# Patient Record
Sex: MALE | Race: OTHER | HISPANIC OR LATINO | URBAN - METROPOLITAN AREA
[De-identification: names, ages, dates, MRNs, and addresses within clinical notes are randomized per-mention and may not be internally consistent; named-entity substitution may affect disease eponyms.]

---

## 2018-01-26 ENCOUNTER — EMERGENCY (EMERGENCY)
Facility: HOSPITAL | Age: 33
LOS: 1 days | Discharge: HOME | End: 2018-01-26
Attending: EMERGENCY MEDICINE | Admitting: EMERGENCY MEDICINE
Payer: MEDICAID

## 2018-01-26 VITALS
OXYGEN SATURATION: 98 % | SYSTOLIC BLOOD PRESSURE: 103 MMHG | DIASTOLIC BLOOD PRESSURE: 60 MMHG | RESPIRATION RATE: 16 BRPM | HEART RATE: 77 BPM | TEMPERATURE: 100 F

## 2018-01-26 VITALS
HEART RATE: 104 BPM | DIASTOLIC BLOOD PRESSURE: 61 MMHG | RESPIRATION RATE: 18 BRPM | TEMPERATURE: 102 F | OXYGEN SATURATION: 97 % | SYSTOLIC BLOOD PRESSURE: 103 MMHG

## 2018-01-26 PROCEDURE — 99284 EMERGENCY DEPT VISIT MOD MDM: CPT | Mod: 25

## 2018-01-26 PROCEDURE — 71046 X-RAY EXAM CHEST 2 VIEWS: CPT | Mod: 26

## 2018-01-26 RX ORDER — SODIUM CHLORIDE 9 MG/ML
1000 INJECTION INTRAMUSCULAR; INTRAVENOUS; SUBCUTANEOUS ONCE
Qty: 0 | Refills: 0 | Status: COMPLETED | OUTPATIENT
Start: 2018-01-26 | End: 2018-01-26

## 2018-01-26 RX ORDER — ACETAMINOPHEN 500 MG
975 TABLET ORAL ONCE
Qty: 0 | Refills: 0 | Status: COMPLETED | OUTPATIENT
Start: 2018-01-26 | End: 2018-01-26

## 2018-01-26 RX ADMIN — Medication 975 MILLIGRAM(S): at 02:47

## 2018-01-26 RX ADMIN — SODIUM CHLORIDE 1000 MILLILITER(S): 9 INJECTION INTRAMUSCULAR; INTRAVENOUS; SUBCUTANEOUS at 02:41

## 2018-01-26 NOTE — ED ADULT NURSE NOTE - OBJECTIVE STATEMENT
pt rc'd to room 17 A&Ox3 c/o flu-like symptoms. Pt states he woke up from sleep with chills and felt SOB. Pt c/o general malaise fevers/chills/ nasal congestion x1 week. Pt denies any medical history/medication use. Respirations even/ unlabored, O2 sat 95% on RA, lungs clear to ausculation. Pt denies CP/SOB/urinary changes. IV 20G placed L ac vein, MD evaluated, NSR on monitor

## 2018-01-26 NOTE — ED PROVIDER NOTE - OBJECTIVE STATEMENT
31 y/o M with no significant PMH here with 1 week of mylagias, fever, dry cough, nasal congestion.  Pt has been taking theraflu at home for symptoms.  Tonight while lying in bed started to feel difficulty breathing due to congestion.  He went to stand up, but upon standing felt lightheaded as if he were going to fall over.  Lightheadedness improves with sitting down.  He endorses poor appetite, but is still drinking fluids.  Last medication was nyquil around 7pm.  No known sick contacts, recent travel or hospitalizations.  No chest pain back pain, palpitations, abd pain, n/v/d, urinary sxs.

## 2018-01-26 NOTE — ED PROVIDER NOTE - MEDICAL DECISION MAKING DETAILS
31 y/o M with 1 week of flu like sxs.  Likely viral and pt w evidence of dehydration, will hydrate, antipyretics, check cxr given cough x 1 week r/o undelrying pna.  Plan for home with outpatient follow-up.

## 2018-01-26 NOTE — ED ADULT TRIAGE NOTE - CHIEF COMPLAINT QUOTE
Patient from home c/o generalized malaise. Patient c/o malaise, sweating in bed at night. Patient took vicks and nyquil with no relief. Last nyquil around 7:30pm last night. Denies any other medical hx. Febrile in triage.

## 2018-04-04 PROBLEM — Z00.00 ENCOUNTER FOR PREVENTIVE HEALTH EXAMINATION: Status: ACTIVE | Noted: 2018-04-04

## 2018-04-10 ENCOUNTER — APPOINTMENT (OUTPATIENT)
Dept: UROLOGY | Facility: CLINIC | Age: 33
End: 2018-04-10
Payer: MEDICAID

## 2018-04-10 VITALS
HEART RATE: 71 BPM | WEIGHT: 186 LBS | DIASTOLIC BLOOD PRESSURE: 83 MMHG | SYSTOLIC BLOOD PRESSURE: 148 MMHG | HEIGHT: 66 IN | RESPIRATION RATE: 16 BRPM | BODY MASS INDEX: 29.89 KG/M2

## 2018-04-10 DIAGNOSIS — G89.29 LOW BACK PAIN: ICD-10-CM

## 2018-04-10 DIAGNOSIS — Z80.42 FAMILY HISTORY OF MALIGNANT NEOPLASM OF PROSTATE: ICD-10-CM

## 2018-04-10 DIAGNOSIS — M54.5 LOW BACK PAIN: ICD-10-CM

## 2018-04-10 DIAGNOSIS — R10.2 PELVIC AND PERINEAL PAIN: ICD-10-CM

## 2018-04-10 PROCEDURE — 99204 OFFICE O/P NEW MOD 45 MIN: CPT

## 2018-04-11 LAB
BILIRUB UR QL STRIP: NORMAL
GLUCOSE UR-MCNC: NORMAL
HCG UR QL: 0.2 EU/DL
HGB UR QL STRIP.AUTO: NORMAL
KETONES UR-MCNC: NORMAL
LEUKOCYTE ESTERASE UR QL STRIP: NORMAL
NITRITE UR QL STRIP: NORMAL
PH UR STRIP: 7
PROT UR STRIP-MCNC: NORMAL
SP GR UR STRIP: 1.01

## 2018-04-16 ENCOUNTER — FORM ENCOUNTER (OUTPATIENT)
Age: 33
End: 2018-04-16

## 2018-04-17 ENCOUNTER — APPOINTMENT (OUTPATIENT)
Dept: ULTRASOUND IMAGING | Facility: IMAGING CENTER | Age: 33
End: 2018-04-17
Payer: MEDICAID

## 2018-04-17 ENCOUNTER — OUTPATIENT (OUTPATIENT)
Dept: OUTPATIENT SERVICES | Facility: HOSPITAL | Age: 33
LOS: 1 days | End: 2018-04-17
Payer: MEDICAID

## 2018-04-17 DIAGNOSIS — Z00.8 ENCOUNTER FOR OTHER GENERAL EXAMINATION: ICD-10-CM

## 2018-04-17 PROCEDURE — 76770 US EXAM ABDO BACK WALL COMP: CPT | Mod: 26

## 2018-04-17 PROCEDURE — 76770 US EXAM ABDO BACK WALL COMP: CPT

## 2018-05-01 ENCOUNTER — TRANSCRIPTION ENCOUNTER (OUTPATIENT)
Age: 33
End: 2018-05-01

## 2018-06-01 ENCOUNTER — OUTPATIENT (OUTPATIENT)
Dept: OUTPATIENT SERVICES | Facility: HOSPITAL | Age: 33
LOS: 1 days | End: 2018-06-01

## 2018-06-26 ENCOUNTER — EMERGENCY (EMERGENCY)
Facility: HOSPITAL | Age: 33
LOS: 1 days | Discharge: HOME | End: 2018-06-26
Attending: EMERGENCY MEDICINE
Payer: MEDICAID

## 2018-06-26 ENCOUNTER — APPOINTMENT (OUTPATIENT)
Dept: SPORTS MEDICINE | Facility: CLINIC | Age: 33
End: 2018-06-26
Payer: MEDICAID

## 2018-06-26 VITALS
HEART RATE: 93 BPM | WEIGHT: 186.07 LBS | OXYGEN SATURATION: 98 % | SYSTOLIC BLOOD PRESSURE: 118 MMHG | DIASTOLIC BLOOD PRESSURE: 70 MMHG | TEMPERATURE: 98 F | RESPIRATION RATE: 16 BRPM

## 2018-06-26 DIAGNOSIS — S92.354D NONDISPLACED FRACTURE OF FIFTH METATARSAL BONE, RIGHT FOOT, SUBSEQUENT ENCOUNTER FOR FRACTURE WITH ROUTINE HEALING: ICD-10-CM

## 2018-06-26 PROCEDURE — 29515 APPLICATION SHORT LEG SPLINT: CPT | Mod: RT

## 2018-06-26 PROCEDURE — 73630 X-RAY EXAM OF FOOT: CPT

## 2018-06-26 PROCEDURE — 29515 APPLICATION SHORT LEG SPLINT: CPT

## 2018-06-26 PROCEDURE — 99204 OFFICE O/P NEW MOD 45 MIN: CPT

## 2018-06-26 PROCEDURE — 73630 X-RAY EXAM OF FOOT: CPT | Mod: 26,RT

## 2018-06-26 PROCEDURE — 99283 EMERGENCY DEPT VISIT LOW MDM: CPT | Mod: 25

## 2018-06-26 NOTE — ED ADULT NURSE NOTE - OBJECTIVE STATEMENT
31 y/o M, no PMH, PSH, daily meds, presents ambulatory to ED c/o R lateral foot pain, ecchymosis, swelling s/p inversion injury while playing basketball 4 days ago, Saturday. 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 Pt has pos and equal sensation to extremities bilat, pos and equal strength to extremities x 4, strong peripheral pulses x 4, no numbness/tingling.

## 2018-06-26 NOTE — ED ADULT NURSE NOTE - DISCHARGE TEACHING
Kenzie TRAVIS, pt verbalizes understanding to f/u with PCP and return to ED for any worsening symptoms.

## 2018-06-26 NOTE — ED PROVIDER NOTE - PROGRESS NOTE DETAILS
Saad Perea PGY1: seen w/ Dr. [place], declining pain meds, pain bearable, still pending xr Saad Perea PGY1: XR w/ fx base 5th metatarsal c/w julito fx; will apply splint, dc w/ close outpt ortho and podiatry f/u

## 2018-06-26 NOTE — ED ADULT TRIAGE NOTE - CHIEF COMPLAINT QUOTE
pt c/o "rt foot pain s/p injury playing basketball on Sat. Noticed bruising to side of foot this am"

## 2018-06-26 NOTE — ED PROVIDER NOTE - OBJECTIVE STATEMENT
32y male  no pmh,no habits comes to ed complains of pain rt foot after inversion same playing basketball. Has pain on walking but none at rest. Has to heel walk, No other injury or complaint. Localized to [facility] mt

## 2018-07-01 ENCOUNTER — OUTPATIENT (OUTPATIENT)
Dept: OUTPATIENT SERVICES | Facility: HOSPITAL | Age: 33
LOS: 1 days | End: 2018-07-01

## 2018-07-02 DIAGNOSIS — Z71.89 OTHER SPECIFIED COUNSELING: ICD-10-CM

## 2018-07-25 DIAGNOSIS — Z71.89 OTHER SPECIFIED COUNSELING: ICD-10-CM

## 2019-02-02 ENCOUNTER — EMERGENCY (EMERGENCY)
Facility: HOSPITAL | Age: 34
LOS: 1 days | Discharge: HOME | End: 2019-02-02
Attending: EMERGENCY MEDICINE
Payer: MEDICAID

## 2019-02-02 VITALS
SYSTOLIC BLOOD PRESSURE: 119 MMHG | HEART RATE: 74 BPM | WEIGHT: 186.07 LBS | TEMPERATURE: 98 F | OXYGEN SATURATION: 97 % | HEIGHT: 72 IN | DIASTOLIC BLOOD PRESSURE: 72 MMHG | RESPIRATION RATE: 20 BRPM

## 2019-02-02 LAB
ALBUMIN SERPL ELPH-MCNC: 5.1 G/DL — HIGH (ref 3.3–5)
ALP SERPL-CCNC: 89 U/L — SIGNIFICANT CHANGE UP (ref 40–120)
ALT FLD-CCNC: 30 U/L — SIGNIFICANT CHANGE UP (ref 10–45)
ANION GAP SERPL CALC-SCNC: 13 MMOL/L — SIGNIFICANT CHANGE UP (ref 5–17)
AST SERPL-CCNC: 21 U/L — SIGNIFICANT CHANGE UP (ref 10–40)
BASOPHILS # BLD AUTO: 0 K/UL — SIGNIFICANT CHANGE UP (ref 0–0.2)
BASOPHILS NFR BLD AUTO: 0.3 % — SIGNIFICANT CHANGE UP (ref 0–2)
BILIRUB SERPL-MCNC: 0.7 MG/DL — SIGNIFICANT CHANGE UP (ref 0.2–1.2)
BUN SERPL-MCNC: 13 MG/DL — SIGNIFICANT CHANGE UP (ref 7–23)
CALCIUM SERPL-MCNC: 10 MG/DL — SIGNIFICANT CHANGE UP (ref 8.4–10.5)
CHLORIDE SERPL-SCNC: 100 MMOL/L — SIGNIFICANT CHANGE UP (ref 96–108)
CO2 SERPL-SCNC: 25 MMOL/L — SIGNIFICANT CHANGE UP (ref 22–31)
CREAT SERPL-MCNC: 1.13 MG/DL — SIGNIFICANT CHANGE UP (ref 0.5–1.3)
D DIMER BLD IA.RAPID-MCNC: <150 NG/ML DDU — SIGNIFICANT CHANGE UP
EOSINOPHIL # BLD AUTO: 0.1 K/UL — SIGNIFICANT CHANGE UP (ref 0–0.5)
EOSINOPHIL NFR BLD AUTO: 0.6 % — SIGNIFICANT CHANGE UP (ref 0–6)
GLUCOSE SERPL-MCNC: 94 MG/DL — SIGNIFICANT CHANGE UP (ref 70–99)
HCT VFR BLD CALC: 50.2 % — HIGH (ref 39–50)
HGB BLD-MCNC: 17.7 G/DL — HIGH (ref 13–17)
LYMPHOCYTES # BLD AUTO: 2.8 K/UL — SIGNIFICANT CHANGE UP (ref 1–3.3)
LYMPHOCYTES # BLD AUTO: 32.7 % — SIGNIFICANT CHANGE UP (ref 13–44)
MCHC RBC-ENTMCNC: 28.9 PG — SIGNIFICANT CHANGE UP (ref 27–34)
MCHC RBC-ENTMCNC: 35.2 GM/DL — SIGNIFICANT CHANGE UP (ref 32–36)
MCV RBC AUTO: 81.9 FL — SIGNIFICANT CHANGE UP (ref 80–100)
MONOCYTES # BLD AUTO: 0.5 K/UL — SIGNIFICANT CHANGE UP (ref 0–0.9)
MONOCYTES NFR BLD AUTO: 5.5 % — SIGNIFICANT CHANGE UP (ref 2–14)
NEUTROPHILS # BLD AUTO: 5.3 K/UL — SIGNIFICANT CHANGE UP (ref 1.8–7.4)
NEUTROPHILS NFR BLD AUTO: 60.9 % — SIGNIFICANT CHANGE UP (ref 43–77)
PLATELET # BLD AUTO: 308 K/UL — SIGNIFICANT CHANGE UP (ref 150–400)
POTASSIUM SERPL-MCNC: 4.2 MMOL/L — SIGNIFICANT CHANGE UP (ref 3.5–5.3)
POTASSIUM SERPL-SCNC: 4.2 MMOL/L — SIGNIFICANT CHANGE UP (ref 3.5–5.3)
PROT SERPL-MCNC: 8.6 G/DL — HIGH (ref 6–8.3)
RBC # BLD: 6.12 M/UL — HIGH (ref 4.2–5.8)
RBC # FLD: 12 % — SIGNIFICANT CHANGE UP (ref 10.3–14.5)
SODIUM SERPL-SCNC: 138 MMOL/L — SIGNIFICANT CHANGE UP (ref 135–145)
TROPONIN T, HIGH SENSITIVITY RESULT: <6 NG/L — SIGNIFICANT CHANGE UP (ref 0–51)
WBC # BLD: 8.7 K/UL — SIGNIFICANT CHANGE UP (ref 3.8–10.5)
WBC # FLD AUTO: 8.7 K/UL — SIGNIFICANT CHANGE UP (ref 3.8–10.5)

## 2019-02-02 PROCEDURE — 71046 X-RAY EXAM CHEST 2 VIEWS: CPT | Mod: 26

## 2019-02-02 PROCEDURE — 99220: CPT

## 2019-02-02 RX ORDER — FAMOTIDINE 10 MG/ML
20 INJECTION INTRAVENOUS ONCE
Qty: 0 | Refills: 0 | Status: COMPLETED | OUTPATIENT
Start: 2019-02-02 | End: 2019-02-02

## 2019-02-02 RX ORDER — SODIUM CHLORIDE 9 MG/ML
1000 INJECTION INTRAMUSCULAR; INTRAVENOUS; SUBCUTANEOUS ONCE
Qty: 0 | Refills: 0 | Status: COMPLETED | OUTPATIENT
Start: 2019-02-02 | End: 2019-02-02

## 2019-02-02 RX ADMIN — SODIUM CHLORIDE 1000 MILLILITER(S): 9 INJECTION INTRAMUSCULAR; INTRAVENOUS; SUBCUTANEOUS at 19:58

## 2019-02-02 RX ADMIN — FAMOTIDINE 20 MILLIGRAM(S): 10 INJECTION INTRAVENOUS at 21:19

## 2019-02-02 RX ADMIN — SODIUM CHLORIDE 1000 MILLILITER(S): 9 INJECTION INTRAMUSCULAR; INTRAVENOUS; SUBCUTANEOUS at 20:52

## 2019-02-02 RX ADMIN — Medication 30 MILLILITER(S): at 21:19

## 2019-02-02 NOTE — ED CDU PROVIDER INITIAL DAY NOTE - PROGRESS NOTE DETAILS
Pt seen at bedside. Pt in NAD, comfortable. VS stable from last reading. No events on tele. Pt has no complaints at this time. Denies palpitations, CP, n/v, SOB, diaphoresis, neck/jaw/arm pain, lightheadedness. Pt notes that when he ate an hour ago he felt some mild heartburn that resolved after ~10 minutes, requesting GERD medication after next meal. PE: RRR clear S1 S2 no m/g/r. CTA b/l. Abd soft NT. No calf swelling/ttp. Will continue to monitor.

## 2019-02-02 NOTE — ED CDU PROVIDER INITIAL DAY NOTE - NS ED ROS FT
General: No fevers / chills  HENT: No head trauma, ear pain, runny nose, or sore throat  Eyes: No visual changes  CP: + mild chest pain, +palpitations, +light headedness, +near-syncope  Resp: No shortness of breath, no cough  GI: No abdominal pain, diarrhea, constipation, nausea, or vomiting  : No urinary fz, dysuria, or hematuria  Neuro: No numbness, tingling, or weakness

## 2019-02-02 NOTE — ED ADULT NURSE REASSESSMENT NOTE - COMFORT CARE
warm blanket provided/ambulated to bathroom/plan of care explained/darkened lights/po fluids offered/repositioned

## 2019-02-02 NOTE — ED ADULT NURSE REASSESSMENT NOTE - NS ED NURSE REASSESS COMMENT FT1
18.30 hrs received  from main ED for the management chest pain Palpitations Pt  is for Echo    pt alert and responsive, oriented x4, denies any respiratory distress, SOB, or difficulty breathing.  pt is pain free at this time and asymptomatic. On telemetry hr: 70's SR on monitor. IV in place, patent and free of signs of infiltration,  V/S stable, pt afebrile, pt denies pain at this time. Pt educated on unit and unit rules, instructed patient to notify RN of any needed assistance, Pt verbalizes understanding, Call bell placed within reach. Safety maintained. Will continue to monitor.
Received pt from THADDEUS Bhatia , received pt alert and responsive, oriented x4, denies any respiratory distress, SOB, or difficulty breathing. Pt transferred to CDU for chest pain, pt is currently pain free. He denies chest pain, SOB, diaphoresis, dizziness, lightheadedness, N/V, F/C. On telemetry pt is SR hr: 80's. Pending ECHO in AM, pt aware.  IV in place, patent and free of signs of infiltration, V/S stable, pt afebrile,  Pt educated on unit and unit rules, instructed patient to notify RN of any needed assistance, Pt verbalizes understanding, Call bell placed within reach. Safety maintained. Will continue to monitor. Family at bedside.

## 2019-02-02 NOTE — ED PROVIDER NOTE - NS ED ROS FT
General: No fevers / chills  HENT: No head trauma, ear pain, runny nose, or sore throat  Eyes: No visual changes  CP: No chest pain, +palpitations, +light headedness, +near-syncope  Resp: No shortness of breath, no cough  GI: No abdominal pain, diarrhea, constipation, nausea, or vomiting  : No urinary fz, dysuria, or hematuria  Neuro: No numbness, tingling, or weakness

## 2019-02-02 NOTE — ED CDU PROVIDER INITIAL DAY NOTE - OBJECTIVE STATEMENT
34 yo m denies pmh, non smoker,   seen at [facility]  ER yesterday with vasovagal syncope returns with similar sx of near-syncope. Woke up this morning, felt heart racing and felt like he was going to pass out. Did not actually lose consciousness. pt states similar episode yesterday.  pt does reported lightheaded prior to near syncopal event as well as mild L sided chest pressure and mild SOB. +mild N. last week- URI symptoms, resolved. no symptoms at this time.  Denies similar sx in the past. no leg pain/swelling. no V/D.

## 2019-02-02 NOTE — ED ADULT TRIAGE NOTE - SOURCE OF INFORMATION
Problem: Pain:  Goal: Pain level will decrease  Pain level will decrease   Outcome: Ongoing    Goal: Control of acute pain  Control of acute pain   Outcome: Ongoing    Goal: Control of chronic pain  Control of chronic pain   Outcome: Ongoing Patient

## 2019-02-02 NOTE — ED PROVIDER NOTE - OBJECTIVE STATEMENT
32 yo m denies pmh, non smoker, recently seen at [facility] yesterday with vasovagal syncope returns with similar sx of near-syncope. Woke up this morning, felt heart racing and felt like he was going to pass out. Did not actually lose consciousness. Pt reports feeling palpitations, heart racing. Denies similar sx in the past. Denies chest pain or shortness of breath. 34 yo m denies pmh, non smoker,   seen at [facility]  ER yesterday with vasovagal syncope returns with similar sx of near-syncope. Woke up this morning, felt heart racing and felt like he was going to pass out. Did not actually lose consciousness. Pt reports feeling palpitations, heart racing. Denies similar sx in the past. Denies chest pain or shortness of breath.

## 2019-02-02 NOTE — ED ADULT NURSE REASSESSMENT NOTE - GENERAL PATIENT STATE
cooperative/improvement verbalized/family/SO at bedside/resting/sleeping/smiling/interactive/comfortable appearance

## 2019-02-02 NOTE — ED ADULT TRIAGE NOTE - CHIEF COMPLAINT QUOTE
chest pain and pressure since yesterday, "my heart felt like it was racing this morning."  denies "heart racing" at this time.

## 2019-02-02 NOTE — ED CDU PROVIDER INITIAL DAY NOTE - MEDICAL DECISION MAKING DETAILS
33 y old male palpitation and near syncope ,abnormal ECG Concern for arrythmia mvp , will observe in CDU echo and holter tomorrow and on reevaluation: ZR

## 2019-02-02 NOTE — ED PROVIDER NOTE - MEDICAL DECISION MAKING DETAILS
34 yo M no chronic medical conditions recently seen at [facility] for vasovagal synncope, returns for similar sx of heart racing, near-syncope, generally feeling unwell. Most likely vasovagal as pt felt heart racing before the events happened, has no risk factors for cardiogenic syncope. Pt feels well, normal exam, HD stable, at his baseline. EKG without any evidence of wpw/qt prolongation or shortening/brugada/hypokalemia. Will check basic labs, watch on monitor, orthostatics, tsh, reassess.   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   ECG revealed rt axis, incomplete  RT bbb , [place]   death in a family 40 y old uncle. Concern for cardiac arrythmia, ?PE, will obtain basic blood work, d dimer, holter/echo needed, consider CDU  ZR

## 2019-02-02 NOTE — ED ADULT NURSE NOTE - OBJECTIVE STATEMENT
34 y/o M pt w/ no pmh, present to ED for palpitation, chest pain and near syncope, first episode was yesterday when he was in his car, got dizzy, SOB, palpitation, left side chest pressure/pain, seen at [facility] ER yesterday, Woke up this morning, felt heart racing and felt like he was going to pass out. on exam pt A&Ox3, breathing spontaneous, unlabored w/o distress on room air, NAD, denies currently having chest pain, palpitation or SOB, leg swelling or pain, placed CM NSR, EKG done and given to MD, labs drawn and sent

## 2019-02-03 VITALS
SYSTOLIC BLOOD PRESSURE: 114 MMHG | OXYGEN SATURATION: 97 % | HEART RATE: 75 BPM | TEMPERATURE: 99 F | DIASTOLIC BLOOD PRESSURE: 70 MMHG | RESPIRATION RATE: 19 BRPM

## 2019-02-03 LAB
CHOLEST SERPL-MCNC: 130 MG/DL — SIGNIFICANT CHANGE UP (ref 10–199)
HBA1C BLD-MCNC: 5.6 % — SIGNIFICANT CHANGE UP (ref 4–5.6)
HDLC SERPL-MCNC: 25 MG/DL — LOW
LIPID PNL WITH DIRECT LDL SERPL: 82 MG/DL — SIGNIFICANT CHANGE UP
TOTAL CHOLESTEROL/HDL RATIO MEASUREMENT: 5.2 RATIO — SIGNIFICANT CHANGE UP (ref 3.4–9.6)
TRIGL SERPL-MCNC: 117 MG/DL — SIGNIFICANT CHANGE UP (ref 10–149)
TSH SERPL-MCNC: 0.78 UIU/ML — SIGNIFICANT CHANGE UP (ref 0.27–4.2)

## 2019-02-03 PROCEDURE — 80053 COMPREHEN METABOLIC PANEL: CPT

## 2019-02-03 PROCEDURE — 85379 FIBRIN DEGRADATION QUANT: CPT

## 2019-02-03 PROCEDURE — 93306 TTE W/DOPPLER COMPLETE: CPT | Mod: 26

## 2019-02-03 PROCEDURE — G0378: CPT

## 2019-02-03 PROCEDURE — 99217: CPT

## 2019-02-03 PROCEDURE — 80061 LIPID PANEL: CPT

## 2019-02-03 PROCEDURE — 96361 HYDRATE IV INFUSION ADD-ON: CPT

## 2019-02-03 PROCEDURE — 84484 ASSAY OF TROPONIN QUANT: CPT

## 2019-02-03 PROCEDURE — 84443 ASSAY THYROID STIM HORMONE: CPT

## 2019-02-03 PROCEDURE — 93306 TTE W/DOPPLER COMPLETE: CPT

## 2019-02-03 PROCEDURE — 83036 HEMOGLOBIN GLYCOSYLATED A1C: CPT

## 2019-02-03 PROCEDURE — 71046 X-RAY EXAM CHEST 2 VIEWS: CPT

## 2019-02-03 PROCEDURE — 85027 COMPLETE CBC AUTOMATED: CPT

## 2019-02-03 PROCEDURE — 99284 EMERGENCY DEPT VISIT MOD MDM: CPT | Mod: 25

## 2019-02-03 PROCEDURE — 93005 ELECTROCARDIOGRAM TRACING: CPT

## 2019-02-03 PROCEDURE — 96374 THER/PROPH/DIAG INJ IV PUSH: CPT

## 2019-02-03 NOTE — ED CDU PROVIDER DISPOSITION NOTE - PLAN OF CARE
1. Follow up with your PMD in 1-2 days. If you do not have a PMD you may follow up with our general internal medicine clinic ([phone]) for continued care. Additionally please follow up with a cardiologist or cardiology clinic ([phone]) within 2-3 days.   2. Show copies of your reports given to you.  3. If you develop any worsening or continued chest pain, shortness of breath, palpitations, weakness, nausea/vomiting, lightheadedness, or for any other concerning symptoms please return to the ED immediately. 1. Follow up with your PMD in 1-2 days. Additionally please follow up with either your cardiologist or our cardiology clinic ([phone]) within the next 1-2 days for further evaluation/management regarding your symptoms and placement of a Holter monitor.  2. Show copies of your reports given to you. Recommend Aspirin 81mg over the counter daily until further evaluation by cardiology. Take all of your other medications as previously prescribed.   3. If you develop any worsening or continued chest pain, shortness of breath, dizziness, weakness, abdominal pain, nausea/vomiting or any other concerning symptoms please return to the ED immediately.

## 2019-02-03 NOTE — ED CDU PROVIDER SUBSEQUENT DAY NOTE - HISTORY
Pt seen at bedside. Pt resting comfortably. VS stable from last reading. No events on tele. Will continue to monitor.

## 2019-02-03 NOTE — ED CDU PROVIDER DISPOSITION NOTE - NSFOLLOWUPCLINICS_GEN_ALL_ED_FT
[facility]  Cardiology  [address] [place] [address]  Phone: [phone]  Fax:   Follow Up Time: 1-3 Days

## 2019-02-03 NOTE — ED CDU PROVIDER SUBSEQUENT DAY NOTE - PROGRESS NOTE DETAILS
Pt seen at bedside. Pt sleeping comfortably. VS stable from last reading. No events on tele. Will continue to monitor. Patient seen at bedside in NAD.  VSS.  Patient resting comfortably without complaints. Patient seen at bedside in NAD.  VSS.  Patient resting comfortably without complaints. Denies cp, sob, dizziness, weakness, n/v, abdominal pain. Will continue to monitor. Plan for TTE this morning, paged echo tech, they are aware of patient. - Hilario Islas PA-C Patient seen at bedside in NAD.  VSS.  Patient resting comfortably without complaints. No events on tele. Pt denies any cp, sob, dizziness, weakness, n/v. TTE resulted, no acute findings. Results reviewed by ED attending who cleared patient for discharge however informed him ED attending from yesterday wanted Holter set up if possible. Called in-house Holter and no answer, advised business hours M-F only. Will reattempt and if unable to get done today ED attending Dr. Hendricks is fine with patient having done as outpatient with 48 hour rapid follow up sheet. Will reattempt. - Hilario Islas PA-C Attempted in-house holter again, no answer. - Hilario Islas PA-C No answer from Holter on 3rd attempt. Discussed w/ ED attending Dr. Hendricks who cleared patient for discharge home with rapid cardiology follow up sheet (given to patient) for outpatient placement of Holter in the next 1-2 days. Gave copy of and went over all results with patient at bedside and he acknowledged understanding. Made him aware of low HDL and need for PMD follow up and diet/lifestyle modifications. Discussed importance of PMD and cardiology f/u in next 1-2 days and advised on strict return precautions. Symptoms improved, no cp, dizziness, shortness of breath, fatigue this AM. Stable for d/c. Attending aware and agrees. - Hilario Islas PA-C

## 2019-02-03 NOTE — ED CDU PROVIDER DISPOSITION NOTE - NSFOLLOWUPINSTRUCTIONS_ED_ALL_ED_FT
1. Follow up with your PMD in 1-2 days. Additionally please follow up with either your cardiologist or our cardiology clinic ([phone]) within the next 1-2 days for further evaluation/management regarding your symptoms and placement of a Holter monitor.  2. Show copies of your reports given to you. Recommend Aspirin 81mg over the counter daily until further evaluation by cardiology. Take all of your other medications as previously prescribed.   3. If you develop any worsening or continued chest pain, shortness of breath, dizziness, weakness, abdominal pain, nausea/vomiting or any other concerning symptoms please return to the ED immediately.

## 2019-02-03 NOTE — ED CDU PROVIDER SUBSEQUENT DAY NOTE - ATTENDING CONTRIBUTION TO CARE
ATTENDING MD: Augie WISE, have seen and evaluated this patient with the advance practice clinician.  I have discussed the history, exam ,and aspects of care with the APC.  We have discussed the full plan of care. I have reviewed the prior ED notes and results as well as the current CDU note. I agree with the findings  unless otherwise stated.    VITALS: reviewed  GEN: NAD, A & O x 4  HEAD/EYES: NCAT, PERRL, EOMI, anicteric sclerae, no conjunctival pallor  ENT: mucus membranes moist, oropharynx WNL, trachea midline, no JVD  RESP: lungs CTA with equal breath sounds bilaterally, chest wall nontender and atraumatic  CV: heart with reg rhythm S1, S2, no murmur; distal pulses intact and symmetric bilaterally  GI: normoactive bowel sounds. the abdomen is soft, nondistended, nontender, there are no palpable masses  MSK: extremities atraumatic and nontender, no edema,   SKIN: warm, dry, no rash, no bruising, no cyanosis. color appropriate for ethnicity  NEURO: alert, mentating appropriately, no facial asymmetry. gross sensation, motor, coordination are intact  PSYCH: Affect appropriate     MDM: Pt with 2 episodes of enar syncope associated with atypical chest pain. ddx includes dysrhythmia, structural abnormality. no events on tele. TTE planned for this AM. do not suspect CAD, EKG reassuring. trops/D-dimer neg, do not think CTA needed. Pt low risk for adverse outcome. If no acute findings will get fit for holter.

## 2019-02-03 NOTE — ED CDU PROVIDER DISPOSITION NOTE - ATTENDING CONTRIBUTION TO CARE
ATTENDING MD: I, Augie Hendricks, have seen and evaluated this patient with the advance practice clinician.  I have discussed the history, exam ,and aspects of care with the APC.  I have reviewed the prior ED notes as well as the current CDU note. I agree with the findings  unless otherwise stated. Please see CDU notes for further details.

## 2019-02-03 NOTE — ED CDU PROVIDER DISPOSITION NOTE - CLINICAL COURSE
34 yo m denies pmh, non smoker,   seen at [facility]  ER yesterday with vasovagal syncope returns with similar sx of near-syncope. Woke up this morning, felt heart racing and felt like he was going to pass out. Did not actually lose consciousness. pt states similar episode yesterday.  pt does reported lightheaded prior to near syncopal event as well as mild L sided chest pressure and mild SOB. +mild N. last week- URI symptoms, resolved. no symptoms at this time. Denies similar sx in the past. no leg pain/swelling. no V/D.  ED Course: CBC and CMP non actionable, HST <6, D-dimer negative, CXR unremarkable. Pt sent to CDU for TTE, frequent eval, tele.  In CDU: Pt remained asx overnight, repeat troponin was <6 and TSH resulted wnl. TTE showed____. 34 yo m denies pmh, non smoker,   seen at [facility]  ER yesterday with vasovagal syncope returns with similar sx of near-syncope. Woke up this morning, felt heart racing and felt like he was going to pass out. Did not actually lose consciousness. pt states similar episode yesterday.  pt does reported lightheaded prior to near syncopal event as well as mild L sided chest pressure and mild SOB. +mild N. last week- URI symptoms, resolved. no symptoms at this time. Denies similar sx in the past. no leg pain/swelling. no V/D.  ED Course: CBC and CMP non actionable, HST <6, D-dimer negative, CXR unremarkable. Pt sent to CDU for TTE, frequent eval, tele.  In CDU: Pt remained asx overnight, repeat troponin was <6 and TSH resulted wnl. TTE showed no emergent findings, showed minimal mitral regurgitation, normal LV function and otherwise normal. Attempted to place Holter monitor while patient here but was unable to 2/2 Holter closed after attempting multiple times. ED attending Dr. Hendricks evaluated patient and cleared him for discharge home with PMD follow up, rapid cardiology follow up in the next 1-2 days for outpatient Holter monitor placement. Patient stable at time of discharge. Strict return precautions discussed. Case discussed with ED attending.

## 2019-02-04 ENCOUNTER — APPOINTMENT (OUTPATIENT)
Dept: CARDIOLOGY | Facility: CLINIC | Age: 34
End: 2019-02-04
Payer: MEDICAID

## 2019-02-04 VITALS
BODY MASS INDEX: 29.89 KG/M2 | OXYGEN SATURATION: 99 % | HEART RATE: 83 BPM | HEIGHT: 66 IN | WEIGHT: 186 LBS | SYSTOLIC BLOOD PRESSURE: 113 MMHG | DIASTOLIC BLOOD PRESSURE: 79 MMHG

## 2019-02-04 PROCEDURE — 93000 ELECTROCARDIOGRAM COMPLETE: CPT

## 2019-02-04 PROCEDURE — 99204 OFFICE O/P NEW MOD 45 MIN: CPT

## 2019-02-04 NOTE — REASON FOR VISIT
[Initial Evaluation] : an initial evaluation of [FreeTextEntry1] : 33 year old male no PMHX, no meds  4 days ago was having a emotional conversation, fighting, then experienced a fast heart rate that lasted <5 minutes.  Resolved with sitting down and calming down.  Went to an ER in [facility], ecg and xray normal, was sent home.  The following day (saterday) felt anxious and again started to have a faster heart rate, his iWatch showed HR of 105, went to [facility] ER.  Echo normal, ECG sinus, spent 2 days in the ER, was discharged, asked to establish care for a holter monitor.  \par \par No Tobacco\par + ETOH last week while in [place]\par \par states he was recently sick with the Flu 2 weeks ago, took a medication for this\par Urine is yellow in color.  \par Here with his sister.  \par D-dimer was negative over the weekend.  \par \par No syncope, no chest pain.  \par

## 2019-02-04 NOTE — ASSESSMENT
[FreeTextEntry1] : Assessment:\par 1.  Palpitations\par 2.  near syncope\par 3.  Recent viral illness/flu\par \par \par Plan\par 1.  Increase hydration and PO intake\par 2.  Activity as tolerated\par 3.  If he gets palpitations, he will take some breaths, sit down and allow them to pass\par 4.  2 week event monitor \par 5.  Await #4, call with results [phone]\par \par \par Castillo

## 2019-02-04 NOTE — REVIEW OF SYSTEMS
[Shortness Of Breath] : no shortness of breath [Dyspnea on exertion] : not dyspnea during exertion [Chest Pain] : no chest pain [Lower Ext Edema] : no extremity edema [Palpitations] : palpitations [Anxiety] : anxiety [Negative] : Heme/Lymph

## 2019-02-04 NOTE — PHYSICAL EXAM
[General Appearance - Well Developed] : well developed [Normal Appearance] : normal appearance [Well Groomed] : well groomed [General Appearance - Well Nourished] : well nourished [No Deformities] : no deformities [General Appearance - In No Acute Distress] : no acute distress [FreeTextEntry1] : anxious [Normal Conjunctiva] : the conjunctiva exhibited no abnormalities [Eyelids - No Xanthelasma] : the eyelids demonstrated no xanthelasmas [Normal Oral Mucosa] : normal oral mucosa [No Oral Pallor] : no oral pallor [No Oral Cyanosis] : no oral cyanosis [Normal Jugular Venous A Waves Present] : normal jugular venous A waves present [Normal Jugular Venous V Waves Present] : normal jugular venous V waves present [No Jugular Venous Rene A Waves] : no jugular venous rene A waves [Heart Rate And Rhythm] : heart rate and rhythm were normal [Heart Sounds] : normal S1 and S2 [Murmurs] : no murmurs present [Respiration, Rhythm And Depth] : normal respiratory rhythm and effort [Exaggerated Use Of Accessory Muscles For Inspiration] : no accessory muscle use [Auscultation Breath Sounds / Voice Sounds] : lungs were clear to auscultation bilaterally [Abdomen Soft] : soft [Abdomen Tenderness] : non-tender [Abdomen Mass (___ Cm)] : no abdominal mass palpated [Abnormal Walk] : normal gait [Gait - Sufficient For Exercise Testing] : the gait was sufficient for exercise testing [Nail Clubbing] : no clubbing of the fingernails [Cyanosis, Localized] : no localized cyanosis [Petechial Hemorrhages (___cm)] : no petechial hemorrhages [Skin Color & Pigmentation] : normal skin color and pigmentation [] : no rash [No Venous Stasis] : no venous stasis [Skin Lesions] : no skin lesions [No Skin Ulcers] : no skin ulcer [No Xanthoma] : no  xanthoma was observed

## 2019-02-06 ENCOUNTER — EMERGENCY (EMERGENCY)
Facility: HOSPITAL | Age: 34
LOS: 1 days | Discharge: HOME | End: 2019-02-06
Attending: EMERGENCY MEDICINE | Admitting: EMERGENCY MEDICINE
Payer: MEDICAID

## 2019-02-06 VITALS
HEART RATE: 95 BPM | WEIGHT: 184.09 LBS | SYSTOLIC BLOOD PRESSURE: 135 MMHG | HEIGHT: 72 IN | DIASTOLIC BLOOD PRESSURE: 82 MMHG | RESPIRATION RATE: 18 BRPM | TEMPERATURE: 98 F | OXYGEN SATURATION: 98 %

## 2019-02-06 VITALS
SYSTOLIC BLOOD PRESSURE: 116 MMHG | OXYGEN SATURATION: 98 % | DIASTOLIC BLOOD PRESSURE: 77 MMHG | HEART RATE: 70 BPM | RESPIRATION RATE: 17 BRPM | TEMPERATURE: 97 F

## 2019-02-06 LAB
ALBUMIN SERPL ELPH-MCNC: 5 G/DL — SIGNIFICANT CHANGE UP (ref 3.3–5)
ALP SERPL-CCNC: 80 U/L — SIGNIFICANT CHANGE UP (ref 40–120)
ALT FLD-CCNC: 22 U/L — SIGNIFICANT CHANGE UP (ref 10–45)
ANION GAP SERPL CALC-SCNC: 13 MMOL/L — SIGNIFICANT CHANGE UP (ref 5–17)
AST SERPL-CCNC: 19 U/L — SIGNIFICANT CHANGE UP (ref 10–40)
BASOPHILS # BLD AUTO: 0 K/UL — SIGNIFICANT CHANGE UP (ref 0–0.2)
BASOPHILS NFR BLD AUTO: 0.5 % — SIGNIFICANT CHANGE UP (ref 0–2)
BILIRUB SERPL-MCNC: 1 MG/DL — SIGNIFICANT CHANGE UP (ref 0.2–1.2)
BUN SERPL-MCNC: 11 MG/DL — SIGNIFICANT CHANGE UP (ref 7–23)
CALCIUM SERPL-MCNC: 9.7 MG/DL — SIGNIFICANT CHANGE UP (ref 8.4–10.5)
CHLORIDE SERPL-SCNC: 102 MMOL/L — SIGNIFICANT CHANGE UP (ref 96–108)
CO2 SERPL-SCNC: 24 MMOL/L — SIGNIFICANT CHANGE UP (ref 22–31)
CREAT SERPL-MCNC: 1.26 MG/DL — SIGNIFICANT CHANGE UP (ref 0.5–1.3)
EOSINOPHIL # BLD AUTO: 0 K/UL — SIGNIFICANT CHANGE UP (ref 0–0.5)
EOSINOPHIL NFR BLD AUTO: 0.3 % — SIGNIFICANT CHANGE UP (ref 0–6)
GLUCOSE SERPL-MCNC: 93 MG/DL — SIGNIFICANT CHANGE UP (ref 70–99)
HCT VFR BLD CALC: 48.5 % — SIGNIFICANT CHANGE UP (ref 39–50)
HGB BLD-MCNC: 16.6 G/DL — SIGNIFICANT CHANGE UP (ref 13–17)
LYMPHOCYTES # BLD AUTO: 2.2 K/UL — SIGNIFICANT CHANGE UP (ref 1–3.3)
LYMPHOCYTES # BLD AUTO: 25.5 % — SIGNIFICANT CHANGE UP (ref 13–44)
MAGNESIUM SERPL-MCNC: 2.3 MG/DL — SIGNIFICANT CHANGE UP (ref 1.6–2.6)
MCHC RBC-ENTMCNC: 27.8 PG — SIGNIFICANT CHANGE UP (ref 27–34)
MCHC RBC-ENTMCNC: 34.2 GM/DL — SIGNIFICANT CHANGE UP (ref 32–36)
MCV RBC AUTO: 81.4 FL — SIGNIFICANT CHANGE UP (ref 80–100)
MONOCYTES # BLD AUTO: 0.6 K/UL — SIGNIFICANT CHANGE UP (ref 0–0.9)
MONOCYTES NFR BLD AUTO: 7.3 % — SIGNIFICANT CHANGE UP (ref 2–14)
NEUTROPHILS # BLD AUTO: 5.7 K/UL — SIGNIFICANT CHANGE UP (ref 1.8–7.4)
NEUTROPHILS NFR BLD AUTO: 66.3 % — SIGNIFICANT CHANGE UP (ref 43–77)
OB PNL STL: NEGATIVE — SIGNIFICANT CHANGE UP
PHOSPHATE SERPL-MCNC: 2.3 MG/DL — LOW (ref 2.5–4.5)
PLATELET # BLD AUTO: 287 K/UL — SIGNIFICANT CHANGE UP (ref 150–400)
POTASSIUM SERPL-MCNC: 4.2 MMOL/L — SIGNIFICANT CHANGE UP (ref 3.5–5.3)
POTASSIUM SERPL-SCNC: 4.2 MMOL/L — SIGNIFICANT CHANGE UP (ref 3.5–5.3)
PROT SERPL-MCNC: 7.9 G/DL — SIGNIFICANT CHANGE UP (ref 6–8.3)
RBC # BLD: 5.96 M/UL — HIGH (ref 4.2–5.8)
RBC # FLD: 11.9 % — SIGNIFICANT CHANGE UP (ref 10.3–14.5)
SODIUM SERPL-SCNC: 139 MMOL/L — SIGNIFICANT CHANGE UP (ref 135–145)
WBC # BLD: 8.5 K/UL — SIGNIFICANT CHANGE UP (ref 3.8–10.5)
WBC # FLD AUTO: 8.5 K/UL — SIGNIFICANT CHANGE UP (ref 3.8–10.5)

## 2019-02-06 PROCEDURE — 99283 EMERGENCY DEPT VISIT LOW MDM: CPT

## 2019-02-06 PROCEDURE — 80053 COMPREHEN METABOLIC PANEL: CPT

## 2019-02-06 PROCEDURE — 82272 OCCULT BLD FECES 1-3 TESTS: CPT

## 2019-02-06 PROCEDURE — 84100 ASSAY OF PHOSPHORUS: CPT

## 2019-02-06 PROCEDURE — 83735 ASSAY OF MAGNESIUM: CPT

## 2019-02-06 PROCEDURE — 99284 EMERGENCY DEPT VISIT MOD MDM: CPT | Mod: 25

## 2019-02-06 PROCEDURE — 93010 ELECTROCARDIOGRAM REPORT: CPT

## 2019-02-06 PROCEDURE — 93005 ELECTROCARDIOGRAM TRACING: CPT

## 2019-02-06 PROCEDURE — 85027 COMPLETE CBC AUTOMATED: CPT

## 2019-02-06 NOTE — ED ADULT NURSE NOTE - CHPI ED NUR SYMPTOMS NEG
no shortness of breath/no dizziness/no fever/no chest pain/no chills/no syncope/no vomiting/no back pain/no diaphoresis/no congestion

## 2019-02-06 NOTE — ED PROVIDER NOTE - OBJECTIVE STATEMENT
34 y/o male with no pmhx who presents for palpitations. patient was seen in ED a few days ago and placed in CDU for tele monitoring and echo which was unremarkable. this morning the patient was having a BM and was actively straining when he noted the palpitations and his watch told him his HR was 156. Patient  had holter placed by Dr. Chong on Monday and was advised to have it rechecked in 2 weeks. patient states the symptoms lasted about 1 minute. also with some interminttent nausea over the past few days without vomiting, abdominal pain or diarrhea. + dark stools. no AC.

## 2019-02-06 NOTE — ED PROVIDER NOTE - ATTENDING CONTRIBUTION TO CARE
34 y/o male with the above documented history and HPI who on exam appears very well and comfortable. VSs noted, neck supple, lungs CTA, cardiac sounds s/ audible m/r/g, abdomen soft, NT/ND, extremities s/ asymmetry, calf ttp or palpable cord, skin s/ rash or edema and neurologically intact. There is nothing clinically evident to suggest any emergent process. EKG unremarkable. Basic screening labs ordered. Already wearing a Holter. Will reach out to his physician. If his labs are s/ emergent abnormality and he remains well-appearing s/ arrhythmia in the ED, he should be suitable for DC c/ o/p f/u barring some malignant arrhythmia having been detected by his Holter that warrants more urgent investigation and/or intervention.

## 2019-02-06 NOTE — ED PROVIDER NOTE - PROGRESS NOTE DETAILS
spoke with Dr. Chong, recommends we call [phone] for holter dept. spoke with holter state this type of holter has to be sent out, fastest we can get results is tomorrow. - Sanjuaan Sauceda PA-C

## 2019-02-06 NOTE — ED ADULT NURSE NOTE - OBJECTIVE STATEMENT
Male 33 years  old with no medical history, came in for palpitations. Pt reports he was moving his bowel, straining and suddenly felt palpitations. States he was here twice already for same complaints. Was seen by cardiologist Monday and has a holter monitor on. was seen by PCP yesterday. Denies chest pain, sob. N/V. reports 2 episodes of dark stool. will continue to reassess.

## 2019-02-21 ENCOUNTER — FORM ENCOUNTER (OUTPATIENT)
Age: 34
End: 2019-02-21

## 2019-03-02 NOTE — ED PROVIDER NOTE - NS HIV RISK FACTOR YES
AUGUST PINA  : 1926  ACCOUNT:  [id number]  [phone]  PCP: Dr. Surjit Amador     TODAY'S DATE: 2018  DICTATED BY:  [Dr. August Spencer]      CHIEF COMPLAINT: [Followup of Atrial fibrillation, paroxysmal, Followup of Hypertension, benign, Followup of Pacemaker and not dependent.]    HPI:    [On 2018, August Pina, a 89 year old male, presented with hospital discharge follow-up following a fall.]    89-year-old gentleman history of pacemaker history of paroxysmal correlation hypertension.    Cardiac standpoint doing quite well.  Recently had a fall down some stairs.  His pacemaker was just interrogated in the hospital had no pacemaker dysfunction.  Heart rate blood pressure well controlled lab work shows that his LDL is at 67 HDL 65 triglycerides 96    He is doing quite well          RISK FACTORS:  CAD - Hypertension    REVIEW OF SYSTEMS:    CONS: fatigue and f/u hospital discharge following a fall 10/18. CV: Denies chest pain, dizziness, palpitations. RESP: denies dyspnea, cough or wheezing. MS: no limiting arthritis. NEURO: denies lightheadedness or dizziness. HEM/LYMPH: denies easy bruising. ALL: no new allergies.      PAST HISTORY: GERD, gout, syncope, hernia repair and stomach resection    PAST CV HISTORY: dual-chamber pacemaker (Medtronic, 7/15, [place]), hypertension and paroxysmal atrial fibrillation    FAMILY HISTORY: Negative for premature CAD. Negative for AAA.  SOCIAL HISTORY: SMOKING: Former tobacco use. used to smoke but quit and over 30 years ago. CAFFEINE: 1 cups daily. ALCOHOL: drinks 1-2 per day. EXERCISE: no regular exercise and active. DIET: no special diet. MARITAL STATUS: . OCCUPATION: retired, small business owner.     ALLERGIES: Aspirin Buffered - Oral, Diovan - Oral and Fatigue    MEDICATIONS: Selected prescriptions see below    VITAL SIGNS: [B/P - 134/84 , Pulse - 80, Weight -  134, Height -   63 , BMI - 23.7 ]    CONS: well developed, well  nourished, comfortable and pleasant. WEIGHT: BMI parameters reviewed and discussed. EYES: conjunctivae not injected and no xanthelasma. RESP: respirations with normal rate and rhythm, clear to auscultation. GI: pulsatile aorta. MS: adequate gait for exercise/testing. EXT: no clubbing or cyanosis.  SKIN: pacemaker incision well healed.  NEURO/PSYCH: alert and oriented to time, place and person and normal affect.      CV: PALP: PMI not displaced, no lifts and thrills or rub. AUSC:  regular rhythm, normal S1, S2 without S3; no pathologic murmurs. ABD AORTA: abdominal aorta enlarged. PEDAL: pedal pulses intact. EXT: no peripheral edema.     DECISION MAKIN-year-old gentleman atrial fibrillation paroxysmal pacemaker.  Hypertension right bundle branch block.    A symptom medic previous standpoint.    Recommendations return to current allergy clinic 9-month check labs ahead of time.  Refill all cardiac medication 90-day supply with 4 refills when needed pacemaker evaluation in 6 months he does not needed now he just had it done in the hospital    ASSESSMENT:  1. RBBB  2. Atrial fibrillation, paroxysmal  3. Hypertension, benign  4. Pacemaker, not dependent, dual-chamber, Medtronic 7/15  5. Pacer reprogramming      PLAN:  [Return in 9 months for blood work.    Refill all cardiac medication 90 supply with 4 refills]    PRESCRIPTIONS:   10/08/18 *Metoprolol Succinate 50MG      1 BY MOUTH DAILY IN AM                   17 *Digoxin              125MCG    1 TABLET DAILY                           18 AmLODIPine Besylate   5MG       1 tablet by mouth daily                  02/10/16 Huperzine Serrate A   1%        1 tablet daily (200 mg) OTC              10/08/13 Namenda XR            28MG      1 po daily                               12 Vitamin B 12          100MCG    1 po twice daily                         10/12/10 Pantoprazole Sodium   40MG      1 po daily                               12/15/09 Allopurinol            300MG     1 tablet daily                               August Spencer MD FACC     Unable to consent due to medical condition

## 2019-04-16 ENCOUNTER — EMERGENCY (EMERGENCY)
Facility: HOSPITAL | Age: 34
LOS: 1 days | Discharge: HOME | End: 2019-04-16
Attending: EMERGENCY MEDICINE
Payer: MEDICAID

## 2019-04-16 VITALS
TEMPERATURE: 98 F | HEART RATE: 81 BPM | DIASTOLIC BLOOD PRESSURE: 78 MMHG | RESPIRATION RATE: 18 BRPM | WEIGHT: 179.02 LBS | HEIGHT: 72 IN | SYSTOLIC BLOOD PRESSURE: 119 MMHG | OXYGEN SATURATION: 99 %

## 2019-04-16 VITALS
DIASTOLIC BLOOD PRESSURE: 73 MMHG | HEART RATE: 75 BPM | SYSTOLIC BLOOD PRESSURE: 121 MMHG | RESPIRATION RATE: 16 BRPM | OXYGEN SATURATION: 97 % | TEMPERATURE: 99 F

## 2019-04-16 PROCEDURE — 99283 EMERGENCY DEPT VISIT LOW MDM: CPT

## 2019-04-16 NOTE — ED PROVIDER NOTE - PHYSICAL EXAMINATION
Gen: AAO x 3, NAD  Skin: No rashes or lesions  HEENT: NC/AT, PERRLA, EOMI, MMM  Resp: unlabored CTAB  Cardiac: rrr s1s2, no murmurs, rubs or gallops  GI: ND, +BS, Soft, NT  MSK: mild swelling medial aspect of left knee.  no TTP over patella or medial/lateral knee.  Negative anterior draw.  Negative Rosales. Compartments soft.  Ambulating without assistance.   Neuro: no focal deficits

## 2019-04-16 NOTE — ED PROVIDER NOTE - NSFOLLOWUPCLINICS_GEN_ALL_ED_FT
[facility]  Sports Medicine  [address] [place] [address]  Phone: [phone]  Fax:   Follow Up Time:

## 2019-04-16 NOTE — ED PROVIDER NOTE - ATTENDING CONTRIBUTION TO CARE
Dr. Novoa : I have personally seen and examined this patient at the bedside. I have fully participated in the care of this patient. I have reviewed all pertinent clinical information, including history, physical exam, plan and the PA's note and agree except as noted.     32yo M no PMHx p/w left knee pain x 2 weeks sp colliding with another player while playing basketball. notes was getting better but reinjured his knee with twisting it last week while playing basketball again. no head trauma no loc. able to ambulate on it. notes mild swelling and pain when flexes it fully.    Denies f/c/n/v/cp/sob/palpitations/cough/abd.pain/d/c/dysuria/hematuria. sick contacts/recent travel.    PE:  head; atraumatic normocephalic  eyes: perrla  Heart: rrr s1s2  lungs: ctab  abd: soft, nt nd + bs no rebound/guarding no cva ttp  le: no swelling no calf ttp; mild swelling to medial aspect of the knee no ttp to patella neg anterior drawers test neg mucmurrys test      -->most likely sprain; pt ambulatory will dc with sports medicine follow up recommended pt to get over the counter  ibuprofen; ace wrap dc

## 2019-04-16 NOTE — ED PROVIDER NOTE - NSFOLLOWUPINSTRUCTIONS_ED_ALL_ED_FT
1.  Rest, ice and elevated left leg.   2.  Take Tylenol 650mgs every 4-6 hrs and/or Ibuprofen 600mgs every 6 hrs as needed for pain  3.  Follow up with sports medicine upon discharge  4.  Return to the ER for worsening pain, fevers/chills or any other concerning symptoms

## 2019-04-16 NOTE — ED ADULT NURSE NOTE - NSIMPLEMENTINTERV_GEN_ALL_ED
Implemented All Universal Safety Interventions:  [place] to call system. Call bell, personal items and telephone within reach. Instruct patient to call for assistance. Room bathroom lighting operational. Non-slip footwear when patient is off stretcher. Physically safe environment: no spills, clutter or unnecessary equipment. Stretcher in lowest position, wheels locked, appropriate side rails in place.

## 2019-04-16 NOTE — ED PROVIDER NOTE - CARE PLAN
Principal Discharge DX:	Acute pain of left knee  Assessment and plan of treatment:	1.  Rest, ice and elevated left leg.   2.  Take Tylenol 650mgs every 4-6 hrs and/or Ibuprofen 600mgs every 6 hrs as needed for pain  3.  Follow up with sports medicine upon discharge  4.  Return to the ER for worsening pain, fevers/chills or any other concerning symptoms

## 2019-04-16 NOTE — ED PROVIDER NOTE - OBJECTIVE STATEMENT
32 yo male with no PMHx p/w left knee pain.  Patient reports that he was playing basketball 2 weeks ago when he hit another players knee with his knee.  Patient had pain and swelling after, but it slowly got better.  Last week he tried to play basketball again and "twisted" his left knee with recurrence of pain and swelling. Patient is able to ambulate with full range of motion.  Pain has improved, but it is still not 100% per patient.  Denies fevers, weakness, numbness, redness.

## 2019-04-16 NOTE — ED ADULT NURSE NOTE - OBJECTIVE STATEMENT
33 year old A&Ox3 male presents to ED in wheelchair complaining of left medial knee pain s/p injury 2 weeks ago. Patient states he was playing basketball 2 weeks ago when his left knee hit another player. Denies fall, denies hitting head, states pain was been increasing in severity prompting him to come to ED today. No obvious deformity, redness, bruising, swelling noted to left knee, skin intact. Patient states ambulating has been harder recently due to the pain. Patient ambulated with steady coordinated gait. Denies CP, SOB, n/v/d, fevers, chills, abdominal pain, urinary symptoms, numbness tingling in upper and lower extremities, HA, blurry vision. VSS updated on plan of care.

## 2019-04-19 ENCOUNTER — APPOINTMENT (OUTPATIENT)
Dept: ORTHOPEDIC SURGERY | Facility: CLINIC | Age: 34
End: 2019-04-19
Payer: MEDICAID

## 2019-04-19 DIAGNOSIS — R00.2 PALPITATIONS: ICD-10-CM

## 2019-04-19 DIAGNOSIS — M54.5 LOW BACK PAIN: ICD-10-CM

## 2019-04-19 DIAGNOSIS — M47.816 SPONDYLOSIS W/OUT MYELOPATHY OR RADICULOPATHY, LUMBAR REGION: ICD-10-CM

## 2019-04-19 DIAGNOSIS — G89.29 LOW BACK PAIN: ICD-10-CM

## 2019-04-19 PROCEDURE — 99204 OFFICE O/P NEW MOD 45 MIN: CPT

## 2019-04-19 PROCEDURE — 72100 X-RAY EXAM L-S SPINE 2/3 VWS: CPT

## 2019-04-19 RX ORDER — IBUPROFEN 800 MG/1
800 TABLET, FILM COATED ORAL
Qty: 90 | Refills: 0 | Status: ACTIVE | COMMUNITY
Start: 2019-04-19 | End: 1900-01-01

## 2019-04-19 NOTE — HISTORY OF PRESENT ILLNESS
[de-identified] : This 33-year-old  has a long history of lower back pain worse with sitting and driving. He is not associated leg pain or neurologic symptoms. There is no Valsalva effect. The pain is mild. He also has some back pain lying flat on his back at night relieved by lying on his side. [Pain Location] : pain [4] : a current pain level of 4/10 [Worsening] : worsening [Sitting] : sitting [Prolonged Sitting] : worsened by prolonged sitting [Walking] : not worsened by walking [Standing] : not worsened by standing

## 2019-04-19 NOTE — REASON FOR VISIT
[Back Pain] : back pain [Initial Visit] : an initial visit for [FreeTextEntry2] : Chronic lower back pain

## 2019-04-19 NOTE — DISCUSSION/SUMMARY
[Medication Risks Reviewed] : Medication risks reviewed [de-identified] : I recommended moist he initially started on ibuprofen 800 mg 3 times a day as a nonsteroidal anti-inflammatory. I will see him for followup in 3 and half weeks. He will there are problems with the medication. When his symptoms resolve we will need to start on a program of regular lower back exercises.

## 2019-04-19 NOTE — PHYSICAL EXAM
[de-identified] : He is fully alert and oriented with a normal mood and affect. He is in no acute distress. There are no cutaneous abnormalities were palpable bony defects of the spine. There is no evidence of shortness of breath or respiratory distress. He has a mesomorphic build. There is no paravertebral muscle spasm, sciatic notch tenderness or trochanteric tenderness. Forward flexion of the spine shows the fingertips reaching to within 6 inches of the floor. His lower extremity neurological examination revealed 1+ symmetrical reflexes with normal motor power and sensation. Straight leg raising is negative to 90°. His hips have a full range of motion with normal stability. Vascular exam shows no evidence of varicosities and there is no lymph edema. There are no cutaneous abnormalities of the upper or lower extremities. His upper extremities are normal to inspection and his elbows have a full range of motion with normal motor power and stability. [de-identified] : AP and lateral x-rays of the lumbar spine reveal mild degenerative changes worse at the L1-2 level with some disc space narrowing and osteophyte formation. Sagittal alignment is normal and there are no destructive changes

## 2019-04-23 ENCOUNTER — APPOINTMENT (OUTPATIENT)
Dept: SPORTS MEDICINE | Facility: CLINIC | Age: 34
End: 2019-04-23
Payer: MEDICAID

## 2019-04-23 DIAGNOSIS — M23.92 UNSPECIFIED INTERNAL DERANGEMENT OF LEFT KNEE: ICD-10-CM

## 2019-04-23 DIAGNOSIS — M25.561 PAIN IN RIGHT KNEE: ICD-10-CM

## 2019-04-23 DIAGNOSIS — M25.462 EFFUSION, LEFT KNEE: ICD-10-CM

## 2019-04-23 PROCEDURE — 20611 DRAIN/INJ JOINT/BURSA W/US: CPT

## 2019-04-23 PROCEDURE — 99214 OFFICE O/P EST MOD 30 MIN: CPT | Mod: 25

## 2019-04-23 PROCEDURE — 73564 X-RAY EXAM KNEE 4 OR MORE: CPT

## 2019-04-23 NOTE — PROCEDURE
[Aspiration] : Aspiration [Left] : of the left [Effusion] : Effusion [Joint Pain] : Joint pain [Patient] : patient [Risk] : risk [Alternatives] : alternatives [Benefits] : benefits [Infection] : infection [Bleeding] : bleeding [Verbal Consent Obtained] : verbal consent was obtained prior to the procedure [___mL] : [unfilled] ~UmL of lidocaine [1%] : 1% lidocaine [Needle Gauge___] : Local anesthetic was administered using a [unfilled]-gauge needle [Ultrasound Guided] : The procedure was ultrasound guided.   [Lateral] : lateral [Superior] : superior [18] : an 18-gauge [Bandage Applied] : a bandage [___ mL Fluid] : [unfilled] mL of [None] : none [Tolerated Well] : The patient tolerated the procedure well [Ace Wrap] : an ace wrap [___ Week(s)] : in [unfilled] week(s)

## 2019-04-23 NOTE — HISTORY OF PRESENT ILLNESS
[de-identified] : 33 y M established pt, new injury to L knee.  Went knee --> knee c another person playing basketball.  Struck front of knee, was able to keep playing, no reported dislocation\par +effusion, onset after injury occurred, has not improved\par Pain is primary anterior, worse c ambulation and prolonged sitting.  Works as a .  Notes knee begins aching early in the day\par No mechanical symptoms\par No instability but feels weak\par Went to [facility] ED, XRs reported WNL but unable to review in office.  \par Taking motrin 400 QD without significant relief\par

## 2019-04-23 NOTE — DISCUSSION/SUMMARY
[de-identified] : Otherwise healthy 33-year-old man with blunt injury to the left knee. Exam is concerning for chondral injury given degree of joint effusion and overall lack of instability or mechanical symptoms. There may be a component of a medial meniscus tear though retropatellar pain next is less likely.\par #1: MRI of the left knee as ordered to evaluate presents less degree of chondral injury versus meniscal injury.\par #2: Patient to increase dosage and frequency of ibuprofen to 600 mg t.i.d. as needed. GI precautions were provided.\par #3: Left knee arthrocentesis was performed in the office today. 60 cc of straw-colored fluid were obtained after verbal consent obtained from patient. See procedure note for details. Patient with significant improvement of range of motion pain though noted some more significant retropatellar pain when standing.\par #4: Will follow the patient after MRI.

## 2019-04-30 ENCOUNTER — FORM ENCOUNTER (OUTPATIENT)
Age: 34
End: 2019-04-30

## 2019-05-01 ENCOUNTER — APPOINTMENT (OUTPATIENT)
Dept: MRI IMAGING | Facility: CLINIC | Age: 34
End: 2019-05-01
Payer: MEDICAID

## 2019-05-01 ENCOUNTER — OUTPATIENT (OUTPATIENT)
Dept: OUTPATIENT SERVICES | Facility: HOSPITAL | Age: 34
LOS: 1 days | End: 2019-05-01
Payer: MEDICAID

## 2019-05-01 DIAGNOSIS — Z00.8 ENCOUNTER FOR OTHER GENERAL EXAMINATION: ICD-10-CM

## 2019-05-01 PROCEDURE — 73721 MRI JNT OF LWR EXTRE W/O DYE: CPT | Mod: 26,LT

## 2019-05-01 PROCEDURE — 73721 MRI JNT OF LWR EXTRE W/O DYE: CPT

## 2019-05-07 ENCOUNTER — APPOINTMENT (OUTPATIENT)
Dept: SPORTS MEDICINE | Facility: CLINIC | Age: 34
End: 2019-05-07
Payer: MEDICAID

## 2019-05-07 PROCEDURE — A4570 SPLINT: CPT | Mod: LT

## 2019-05-07 PROCEDURE — 29530 STRAPPING OF KNEE: CPT | Mod: LT

## 2019-05-07 PROCEDURE — 99215 OFFICE O/P EST HI 40 MIN: CPT | Mod: 25

## 2019-05-07 NOTE — PHYSICAL EXAM
[de-identified] : General Appearance: Well-nourished, well developed in no acute distress\par Orientation: Oriented to person, place and time. \par Mood / Affect: Calm\par Gait: normal \par Coordination: normal \par Pulm: no obvious respiratory distress, no audible stridor or wheeze\par \par LEFT Knee\par Mild to moderate effusion.  No overlying erythema or ecchymosis. \par Range of motion approximately 10° to 110° flexion. \par TTP at medial joint line, none at LJL, QT/PT/patella, tibial plateaus\par Patient has no valgus or varus instability but valgus stress reproduces symptoms somewhat\par \par Special Tests-- \par Anterior drawer: Negative\par Lachman: Negative\par Martha: Weakly positive on R\par Trell: Negative\par Thessaly: Negative\par Grind: Positive, reproduces patient's symptoms\par \par No evidence of Baker cyst or posterior knee effusion. \par \par Normal sensation\par 5 out of 5 strength in all muscle groups. \par +2 reflexes at the patella\par Neurovascular exam is intact.\par \par Normal gait \par \par Right knee is entirely WNL [de-identified] : 4/23/19 -- [facility] ultrasound L knee performed in the office today.  Large joint effusion noted most of the suprapatellar recess. Quad tendon, patellar tendon, MCL, LCL, medial and lateral menisci are within normal limits. There is no Garcia cyst.\par \par 4/23/19 -- Four view XR L knee performed in the office today -- Large joint effusion.  No fractures.  Joint is reduced.  Well maintained joint spaces.  \par \par 5/1/19 -- MRI L knee.  See HPI/PACS.

## 2019-05-07 NOTE — REVIEW OF SYSTEMS
[Joint Pain] : joint pain [Joint Swelling] : joint swelling [Arthralgia] : arthralgia [Negative] : Heme/Lymph

## 2019-05-07 NOTE — DISCUSSION/SUMMARY
[de-identified] : Follows up for a 33-year-old man status post MRI to evaluate for suspected chondral injury. MRI demonstrates near-full-thickness tear of the ACL, and injuries to the medial and lateral menisci, and a nondisplaced with subchondral trabecular fracture of the lateral femoral condyle.  Overall, the patient's symptoms are fairly mild say for the mother to large effusion. He has rare episodes of instability and no mechanical symptoms. He is not currently taking medications for pain. Given the extent of injuries on MRI we will refer him to orthopedic surgery for options regarding potential surgical intervention. Should he be deemed nonoperative at least he would be a good PRP candidate. Will followup with patient p.r.n.

## 2019-05-07 NOTE — HISTORY OF PRESENT ILLNESS
[de-identified] : Followup visit for an otherwise healthy 33-year-old man with left knee pain that occurred several weeks ago due to a contact injury in basketball. He is now status post MRI. MRI findings demonstrated high-grade partial-thickness ACL tear, radial tear of the lateral meniscus, subchondral trabecular fracture of the lateral femoral condyle, and questionable meniscocapsular separation of the posterior aspect of the medial compartment. Overall, symptoms or oral is unchanged. He had some improvement of symptoms after his therapeutic arthrocentesis the swelling recurred several days later as expected. He is not taking medications for pain currently. She describes intermittent instability but no mechanical symptoms. He works as a . He has no other complaints currently.\par \par No pertinent past medical history\par No pertinent past surgical history

## 2019-05-07 NOTE — END OF VISIT
[Fellow] : Fellow [] : Fellow [>50% of Time Spent on Counseling and Coordination of Care for  ___] : Greater than 50% of the encounter time was spent on counseling and coordination of care for [unfilled] [Time Spent: ___ minutes] : I have spent [unfilled] minutes of face to face time with the patient

## 2019-05-07 NOTE — PHYSICAL EXAM
[de-identified] : General Appearance: Well-nourished, well developed in no acute distress\par Orientation: Oriented to person, place and time. \par Mood / Affect: Calm\par Gait: normal \par Coordination: normal \par Pulm: no obvious respiratory distress, no audible stridor or wheeze\par \par LEFT/RIGHT Knee\par Mild to moderate effusion.  No overlying erythema or ecchymosis. \par Range of motion approximately 10° to 110° flexion. \par TTP at medial joint line, none at LJL, QT/PT/patella, tibial plateaus\par Patient has no valgus or varus instability but valgus stress reproduces symptoms somewhat\par \par Special Tests-- \par Anterior drawer: Negative\par Lachman: Negative\par Martha: Weakly positive on R\par Trell: Negative\par Thessaly: Negative\par Grind: Positive, reproduces patient's symptoms\par \par No evidence of Baker cyst or posterior knee effusion. \par \par Normal sensation\par 5 out of 5 strength in all muscle groups. \par +2 reflexes at the patella\par Neurovascular exam is intact.\par \par Normal gait  [de-identified] : [facility] ultrasound L knee performed in the office today.  Large joint effusion noted most of the suprapatellar recess. Quad tendon, patellar tendon, MCL, LCL, medial and lateral menisci are within normal limits. There is no Baker cyst.\par \par Four view XR L knee performed in the office today -- Large joint effusion.  No fractures.  Joint is reduced.  Well maintained joint spaces.   [Normal] : Gait: normal

## 2019-05-12 ENCOUNTER — TRANSCRIPTION ENCOUNTER (OUTPATIENT)
Age: 34
End: 2019-05-12

## 2019-05-15 ENCOUNTER — APPOINTMENT (OUTPATIENT)
Dept: ORTHOPEDIC SURGERY | Facility: CLINIC | Age: 34
End: 2019-05-15

## 2019-05-17 ENCOUNTER — APPOINTMENT (OUTPATIENT)
Dept: ORTHOPEDIC SURGERY | Facility: CLINIC | Age: 34
End: 2019-05-17

## 2019-06-05 ENCOUNTER — APPOINTMENT (OUTPATIENT)
Dept: ORTHOPEDIC SURGERY | Facility: CLINIC | Age: 34
End: 2019-06-05

## 2020-01-13 NOTE — ED CDU PROVIDER SUBSEQUENT DAY NOTE - FAMILY HISTORY
Uncle  Still living? Unknown  Family history of sudden cardiac death, Age at diagnosis: Age Unknown Abdominal Pain, N/V/D

## 2020-10-14 NOTE — ED ADULT NURSE NOTE - CCCP TRG CHIEF CMPLNT
palpitations
with patient/pt only has one hearing aid.  as per pt and josé migueleer, dentures and reading glassess are home

## 2022-09-02 NOTE — ED PROVIDER NOTE - CARE PLAN
[place] Border Text: The closure involved the vermilion border. Principal Discharge DX:	Flu-like symptoms

## 2023-05-18 NOTE — ED CDU PROVIDER INITIAL DAY NOTE - PHYSICAL EXAMINATION
PRIMARY RN JUST GAVE PT THE ORDERED SEROQUEL AND NORCO. PT'S SON, MAGALY, ON THE
PHONE WITH PT AND STATED THAT THE PT STATED THAT THE DOCTOR INFORMED HIM THAT
HE WAS ABOUT TO MEET HIS MAKER. RN ASSURED SON AND PT THAT THIS WAS FALSE
INFORMATION AND THAT THE PT NEEDED TO SEE A PRIMARY DOCTOR (AS THE PT STATED
THAT THEY DID NOT WANT TO SEE ONE) TO ADDRESS SOME OF HIS HEALTH ISSUES AND
CONCERNS. SON, MAGALY, VERBALIZED UNDERSTANDING. Gen: NAD, non-toxic, conversational  Eyes: PERRLA, EOMI   HENT: Normocephalic, atraumatic. External ears normal, no rhinorrhea, moist mucous membranes.   CV: RRR, no M/R/G  Resp: CTAB, non-labored, speaking without difficulty on room air  Abd: soft, non tender, non rigid, no guarding or rebound tenderness  Skin: dry, wwp   Neuro: AOx3, speech is fluent and appropriate

## 2023-10-23 NOTE — ED ADULT TRIAGE NOTE - CHIEF COMPLAINT QUOTE
Received RN trigger this date for new care services. Per chart review, therapy recommending 3 days a week of therapy. Pt from home alone and has support from family. CM can assist with home care as needed. No need for SW intervention this date. Please consult SW should new needs arise.    palpitations  pt has Holter monitoring

## 2025-03-03 NOTE — ED ADULT NURSE NOTE - NSFALLRSKPASTHIST_ED_ALL_ED
Last night he received a warning on his cpap machine: [employer] .    He was just here on  and was seen.   Call Mauro pappas   [phone]  what Is he suppose to do?    no

## 2025-04-19 NOTE — ED ADULT TRIAGE NOTE - BP NONINVASIVE SYSTOLIC (MM HG)
103 Abrasion    WHAT YOU NEED TO KNOW:    What is an abrasion? An abrasion is a wound on your skin. Abrasions usually happen when your skin rubs against a rough surface. Examples of an abrasion include rug burn, a skinned elbow, or road rash. Abrasions can be deep or shallow The wound may hurt, bleed, bruise, or swell.    How can I care for my abrasion?    Wash your hands and dry them with a clean towel first.    Press a clean cloth against your wound for 5 to 10 minutes to stop any bleeding.    Rinse your wound with clean water. Do not use harsh soap, alcohol, or iodine solutions.    Use a clean, wet cloth to remove any objects, such as small pieces of rocks or dirt.    Rub antibiotic ointment on your wound. This may help prevent infection and help your wound heal.    Cover the wound with a non-stick bandage. Change the bandage daily, and if it gets wet or dirty.  When should I seek immediate care?    The bleeding does not stop after 10 minutes of firm pressure.    The redness around your wound begins to spread.    You cannot rinse one or more foreign objects out of your wound.  When should I call my doctor?    You have a fever or chills.    Your abrasion is red, warm, swollen, or draining pus.    You have questions or concerns about your condition or care.  CARE AGREEMENT:    You have the right to help plan your care. Learn about your health condition and how it may be treated. Discuss treatment options with your healthcare providers to decide what care you want to receive. You always have the right to refuse treatment.

## 2025-05-20 NOTE — ED PROVIDER NOTE - CROS ED RESP ALL NEG
- - - Products Recommended: Elta MD General Sunscreen Counseling: I recommended a broad spectrum sunscreen with a SPF of 30 or higher.  I explained that SPF 30 sunscreens block approximately 97 percent of the sun's harmful rays.  Sunscreens should be applied at least 15 minutes prior to expected sun exposure and then every 2 hours after that as long as sun exposure continues. If swimming or exercising sunscreen should be reapplied every 45 minutes to an hour after getting wet or sweating.  One ounce, or the equivalent of a shot glass full of sunscreen, is adequate to protect the skin not covered by a bathing suit. I also recommended a lip balm with a sunscreen as well. Sun protective clothing can be used in lieu of sunscreen but must be worn the entire time you are exposed to the sun's rays. Detail Level: Detailed

## 2025-07-02 NOTE — ED ADULT NURSE NOTE - CARDIO WDL
no fever/no chills/no weight loss/no weight gain
Normal rate, regular rhythm, normal S1, S2 heart sounds heard.